# Patient Record
Sex: FEMALE | URBAN - METROPOLITAN AREA
[De-identification: names, ages, dates, MRNs, and addresses within clinical notes are randomized per-mention and may not be internally consistent; named-entity substitution may affect disease eponyms.]

---

## 2022-05-13 ENCOUNTER — APPOINTMENT (RX ONLY)
Dept: URBAN - METROPOLITAN AREA CLINIC 152 | Facility: CLINIC | Age: 52
Setting detail: DERMATOLOGY
End: 2022-05-13

## 2022-05-13 DIAGNOSIS — L20.89 OTHER ATOPIC DERMATITIS: ICD-10-CM | Status: INADEQUATELY CONTROLLED

## 2022-05-13 DIAGNOSIS — L82.1 OTHER SEBORRHEIC KERATOSIS: ICD-10-CM

## 2022-05-13 DIAGNOSIS — L85.3 XEROSIS CUTIS: ICD-10-CM

## 2022-05-13 DIAGNOSIS — L72.8 OTHER FOLLICULAR CYSTS OF THE SKIN AND SUBCUTANEOUS TISSUE: ICD-10-CM

## 2022-05-13 DIAGNOSIS — L70.0 ACNE VULGARIS: ICD-10-CM

## 2022-05-13 DIAGNOSIS — L81.4 OTHER MELANIN HYPERPIGMENTATION: ICD-10-CM

## 2022-05-13 PROBLEM — L30.9 DERMATITIS, UNSPECIFIED: Status: ACTIVE | Noted: 2022-05-13

## 2022-05-13 PROBLEM — L20.84 INTRINSIC (ALLERGIC) ECZEMA: Status: ACTIVE | Noted: 2022-05-13

## 2022-05-13 PROCEDURE — ? PRESCRIPTION MEDICATION MANAGEMENT

## 2022-05-13 PROCEDURE — ? PRESCRIPTION

## 2022-05-13 PROCEDURE — 99204 OFFICE O/P NEW MOD 45 MIN: CPT

## 2022-05-13 PROCEDURE — ? DIAGNOSIS COMMENT

## 2022-05-13 PROCEDURE — ? PHOTO-DOCUMENTATION

## 2022-05-13 PROCEDURE — ? COUNSELING

## 2022-05-13 RX ORDER — TRETINOIN 0.8 MG/G
GEL TOPICAL
Qty: 50 | Refills: 3 | Status: ERX | COMMUNITY
Start: 2022-05-13

## 2022-05-13 RX ORDER — HYDROQUINONE 40 MG/G
CREAM TOPICAL
Qty: 28.35 | Refills: 2 | Status: ERX | COMMUNITY
Start: 2022-05-13

## 2022-05-13 RX ORDER — CLOBETASOL PROPIONATE 0.5 MG/G
OINTMENT TOPICAL
Qty: 45 | Refills: 2 | Status: ERX | COMMUNITY
Start: 2022-05-13

## 2022-05-13 RX ADMIN — CLOBETASOL PROPIONATE: 0.5 OINTMENT TOPICAL at 00:00

## 2022-05-13 RX ADMIN — TRETINOIN: 0.8 GEL TOPICAL at 00:00

## 2022-05-13 RX ADMIN — HYDROQUINONE: 40 CREAM TOPICAL at 00:00

## 2022-05-13 ASSESSMENT — LOCATION DETAILED DESCRIPTION DERM
LOCATION DETAILED: RIGHT ANTERIOR LATERAL DISTAL THIGH
LOCATION DETAILED: RIGHT CENTRAL MALAR CHEEK
LOCATION DETAILED: RIGHT RIB CAGE
LOCATION DETAILED: LEFT PROXIMAL PRETIBIAL REGION
LOCATION DETAILED: RIGHT RADIAL VENTRAL WRIST
LOCATION DETAILED: LEFT INFERIOR MEDIAL MALAR CHEEK
LOCATION DETAILED: LEFT DISTAL PRETIBIAL REGION
LOCATION DETAILED: RIGHT DISTAL PRETIBIAL REGION
LOCATION DETAILED: MONS PUBIS

## 2022-05-13 ASSESSMENT — LOCATION ZONE DERM
LOCATION ZONE: ARM
LOCATION ZONE: FACE
LOCATION ZONE: VULVA
LOCATION ZONE: TRUNK
LOCATION ZONE: LEG

## 2022-05-13 ASSESSMENT — LOCATION SIMPLE DESCRIPTION DERM
LOCATION SIMPLE: RIGHT ARM
LOCATION SIMPLE: GROIN
LOCATION SIMPLE: RIGHT CHEEK
LOCATION SIMPLE: LEFT PRETIBIAL REGION
LOCATION SIMPLE: RIGHT THIGH
LOCATION SIMPLE: ABDOMEN
LOCATION SIMPLE: RIGHT PRETIBIAL REGION
LOCATION SIMPLE: LEFT CHEEK

## 2022-05-13 NOTE — HPI: ITCHING
How Severe Is Your Itching?: mild
Additional History: Pt notes itchy patch on R wrist started becoming itchy around December after she got the Covid-19 vaccine. R wrist appears red/dark brown. Itchy patch on pubic skin started 3-4 years ago. Pt notes no hair growth on that specific area and reports it has been getting darker.

## 2022-05-13 NOTE — PROCEDURE: PRESCRIPTION MEDICATION MANAGEMENT
Detail Level: Zone
Render In Strict Bullet Format?: No
Initiate Treatment: Clobetasol ointment PRN when itchy
Plan: Amlactin lotion

## 2022-05-13 NOTE — HPI: DISCOLORATION (HYPERPIGMENTATION)
Is This A New Presentation, Or A Follow-Up?: Discoloration
How Severe Is It?: mild
Additional History: Pt notes she was taking aspirin when she bumped into something and her R thigh developed a dark rayne. Discoloration on L leg appeared around the same time after she put on a bandaid for a small wound. Bandaids left a hyperpigmented rayne. She notes she used to not get any discoloration prior. \\n

## 2022-05-13 NOTE — PROCEDURE: DIAGNOSIS COMMENT
Comment: Acne: Disc’d Winlevi BID but pt notes improvement with Retin-A. Retin-A micro 0.08% gel called in today. Hyperpigmentation on L pretibial skin and R thigh: Disc’d plan to Tx with hydroquinone 4% cream for 6wks at a time. Eczema vs Lichen planus on pubic skin Recc’d Tx with clobetasol ointment when itchy only. Eczema on R wrist disc’d pt can apply Clobetasol ointment on there as well. Cyst on R flank reassured benign and no Tx Recc’d today. Xerosis: Recc’d Amlactin lotion. DPN: test spot on R cheek tx’ed with ED today. Discussed cosmetic price of $200 for the whole face, advised pt to come 30mins for numbing.
Detail Level: Simple
Render Risk Assessment In Note?: no

## 2022-05-13 NOTE — HPI: PIMPLES (ACNE)
Is This A New Presentation, Or A Follow-Up?: Acne
Additional Comments (Use Complete Sentences): Pt notes she’s had acne her whole life. Breakouts are mainly on jawlines. Pt reports she’s not currently experiencing breakouts. Hx of accutane 2x. Pt notes acne has cyclical and hormonal nature. Not on BC.

## 2022-05-13 NOTE — PROCEDURE: COUNSELING
Benzoyl Peroxide Pregnancy And Lactation Text: This medication is Pregnancy Category C. It is unknown if benzoyl peroxide is excreted in breast milk.
Minocycline Counseling: Patient advised regarding possible photosensitivity and discoloration of the teeth, skin, lips, tongue and gums.  Patient instructed to avoid sunlight, if possible.  When exposed to sunlight, patients should wear protective clothing, sunglasses, and sunscreen.  The patient was instructed to call the office immediately if the following severe adverse effects occur:  hearing changes, easy bruising/bleeding, severe headache, or vision changes.  The patient verbalized understanding of the proper use and possible adverse effects of minocycline.  All of the patient's questions and concerns were addressed.
Topical Clindamycin Counseling: Patient counseled that this medication may cause skin irritation or allergic reactions.  In the event of skin irritation, the patient was advised to reduce the amount of the drug applied or use it less frequently.   The patient verbalized understanding of the proper use and possible adverse effects of clindamycin.  All of the patient's questions and concerns were addressed.
Spironolactone Pregnancy And Lactation Text: This medication can cause feminization of the male fetus and should be avoided during pregnancy. The active metabolite is also found in breast milk.
Dapsone Pregnancy And Lactation Text: This medication is Pregnancy Category C and is not considered safe during pregnancy or breast feeding.
Isotretinoin Counseling: Patient should get monthly blood tests, not donate blood, not drive at night if vision affected, not share medication, and not undergo elective surgery for 6 months after tx completed. Side effects reviewed, pt to contact office should one occur.
Bactrim Pregnancy And Lactation Text: This medication is Pregnancy Category D and is known to cause fetal risk.  It is also excreted in breast milk.
Use Enhanced Medication Counseling?: No
Azelaic Acid Pregnancy And Lactation Text: This medication is considered safe during pregnancy and breast feeding.
Topical Retinoid counseling:  Patient advised to apply a pea-sized amount only at bedtime and wait 30 minutes after washing their face before applying.  If too drying, patient may add a non-comedogenic moisturizer. The patient verbalized understanding of the proper use and possible adverse effects of retinoids.  All of the patient's questions and concerns were addressed.
Minocycline Pregnancy And Lactation Text: This medication is Pregnancy Category D and not consider safe during pregnancy. It is also excreted in breast milk.
Topical Clindamycin Pregnancy And Lactation Text: This medication is Pregnancy Category B and is considered safe during pregnancy. It is unknown if it is excreted in breast milk.
Tetracycline Counseling: Patient counseled regarding possible photosensitivity and increased risk for sunburn.  Patient instructed to avoid sunlight, if possible.  When exposed to sunlight, patients should wear protective clothing, sunglasses, and sunscreen.  The patient was instructed to call the office immediately if the following severe adverse effects occur:  hearing changes, easy bruising/bleeding, severe headache, or vision changes.  The patient verbalized understanding of the proper use and possible adverse effects of tetracycline.  All of the patient's questions and concerns were addressed. Patient understands to avoid pregnancy while on therapy due to potential birth defects.
Doxycycline Counseling:  Patient counseled regarding possible photosensitivity and increased risk for sunburn.  Patient instructed to avoid sunlight, if possible.  When exposed to sunlight, patients should wear protective clothing, sunglasses, and sunscreen.  The patient was instructed to call the office immediately if the following severe adverse effects occur:  hearing changes, easy bruising/bleeding, severe headache, or vision changes.  The patient verbalized understanding of the proper use and possible adverse effects of doxycycline.  All of the patient's questions and concerns were addressed.
Isotretinoin Pregnancy And Lactation Text: This medication is Pregnancy Category X and is considered extremely dangerous during pregnancy. It is unknown if it is excreted in breast milk.
Winlevi Counseling:  I discussed with the patient the risks of topical clascoterone including but not limited to erythema, scaling, itching, and stinging. Patient voiced their understanding.
Birth Control Pills Counseling: Birth Control Pill Counseling: I discussed with the patient the potential side effects of OCPs including but not limited to increased risk of stroke, heart attack, thrombophlebitis, deep venous thrombosis, hepatic adenomas, breast changes, GI upset, headaches, and depression.  The patient verbalized understanding of the proper use and possible adverse effects of OCPs. All of the patient's questions and concerns were addressed.
Topical Retinoid Pregnancy And Lactation Text: This medication is Pregnancy Category C. It is unknown if this medication is excreted in breast milk.
Sarecycline Counseling: Patient advised regarding possible photosensitivity and discoloration of the teeth, skin, lips, tongue and gums.  Patient instructed to avoid sunlight, if possible.  When exposed to sunlight, patients should wear protective clothing, sunglasses, and sunscreen.  The patient was instructed to call the office immediately if the following severe adverse effects occur:  hearing changes, easy bruising/bleeding, severe headache, or vision changes.  The patient verbalized understanding of the proper use and possible adverse effects of sarecycline.  All of the patient's questions and concerns were addressed.
Doxycycline Pregnancy And Lactation Text: This medication is Pregnancy Category D and not consider safe during pregnancy. It is also excreted in breast milk but is considered safe for shorter treatment courses.
High Dose Vitamin A Counseling: Side effects reviewed, pt to contact office should one occur.
Topical Sulfur Applications Counseling: Topical Sulfur Counseling: Patient counseled that this medication may cause skin irritation or allergic reactions.  In the event of skin irritation, the patient was advised to reduce the amount of the drug applied or use it less frequently.   The patient verbalized understanding of the proper use and possible adverse effects of topical sulfur application.  All of the patient's questions and concerns were addressed.
Winlevi Pregnancy And Lactation Text: This medication is considered safe during pregnancy and breastfeeding.
Birth Control Pills Pregnancy And Lactation Text: This medication should be avoided if pregnant and for the first 30 days post-partum.
Azithromycin Counseling:  I discussed with the patient the risks of azithromycin including but not limited to GI upset, allergic reaction, drug rash, diarrhea, and yeast infections.
Tazorac Counseling:  Patient advised that medication is irritating and drying.  Patient may need to apply sparingly and wash off after an hour before eventually leaving it on overnight.  The patient verbalized understanding of the proper use and possible adverse effects of tazorac.  All of the patient's questions and concerns were addressed.
Erythromycin Counseling:  I discussed with the patient the risks of erythromycin including but not limited to GI upset, allergic reaction, drug rash, diarrhea, increase in liver enzymes, and yeast infections.
Detail Level: Zone
Dapsone Counseling: I discussed with the patient the risks of dapsone including but not limited to hemolytic anemia, agranulocytosis, rashes, methemoglobinemia, kidney failure, peripheral neuropathy, headaches, GI upset, and liver toxicity.  Patients who start dapsone require monitoring including baseline LFTs and weekly CBCs for the first month, then every month thereafter.  The patient verbalized understanding of the proper use and possible adverse effects of dapsone.  All of the patient's questions and concerns were addressed.
Topical Sulfur Applications Pregnancy And Lactation Text: This medication is Pregnancy Category C and has an unknown safety profile during pregnancy. It is unknown if this topical medication is excreted in breast milk.
Benzoyl Peroxide Counseling: Patient counseled that medicine may cause skin irritation and bleach clothing.  In the event of skin irritation, the patient was advised to reduce the amount of the drug applied or use it less frequently.   The patient verbalized understanding of the proper use and possible adverse effects of benzoyl peroxide.  All of the patient's questions and concerns were addressed.
Azithromycin Pregnancy And Lactation Text: This medication is considered safe during pregnancy and is also secreted in breast milk.
High Dose Vitamin A Pregnancy And Lactation Text: High dose vitamin A therapy is contraindicated during pregnancy and breast feeding.
Tazorac Pregnancy And Lactation Text: This medication is not safe during pregnancy. It is unknown if this medication is excreted in breast milk.
Spironolactone Counseling: Patient advised regarding risks of diarrhea, abdominal pain, hyperkalemia, birth defects (for female patients), liver toxicity and renal toxicity. The patient may need blood work to monitor liver and kidney function and potassium levels while on therapy. The patient verbalized understanding of the proper use and possible adverse effects of spironolactone.  All of the patient's questions and concerns were addressed.
Erythromycin Pregnancy And Lactation Text: This medication is Pregnancy Category B and is considered safe during pregnancy. It is also excreted in breast milk.
Bactrim Counseling:  I discussed with the patient the risks of sulfa antibiotics including but not limited to GI upset, allergic reaction, drug rash, diarrhea, dizziness, photosensitivity, and yeast infections.  Rarely, more serious reactions can occur including but not limited to aplastic anemia, agranulocytosis, methemoglobinemia, blood dyscrasias, liver or kidney failure, lung infiltrates or desquamative/blistering drug rashes.
Azelaic Acid Counseling: Patient counseled that medicine may cause skin irritation and to avoid applying near the eyes.  In the event of skin irritation, the patient was advised to reduce the amount of the drug applied or use it less frequently.   The patient verbalized understanding of the proper use and possible adverse effects of azelaic acid.  All of the patient's questions and concerns were addressed.
Detail Level: Simple
Detail Level: Detailed
Patient Specific Counseling (Will Not Stick From Patient To Patient): - discussed plan to Tx with hydroquinone 4% cream
Patient Specific Counseling (Will Not Stick From Patient To Patient): - test spot on R cheek tx’ed with ED today\\n- Discussed cosmetic price of $200 for the whole face\\n- before photo taken today

## 2022-05-13 NOTE — HPI: SKIN LESIONS
How Severe Is Your Skin Lesion?: mild
Have Your Skin Lesions Been Treated?: not been treated
Is This A New Presentation, Or A Follow-Up?: Growths
Additional History: Pt notes some lesions have been present since birth while others are new.

## 2022-05-20 ENCOUNTER — RX ONLY (OUTPATIENT)
Age: 52
Setting detail: RX ONLY
End: 2022-05-20

## 2022-05-20 RX ORDER — TRETINOIN 0.8 MG/G
GEL TOPICAL
Qty: 50 | Refills: 3 | Status: ERX

## 2022-05-23 RX ORDER — CLOBETASOL PROPIONATE 0.5 MG/G
OINTMENT TOPICAL
Qty: 45 | Refills: 2 | Status: ERX

## 2022-09-23 ENCOUNTER — APPOINTMENT (RX ONLY)
Dept: URBAN - METROPOLITAN AREA CLINIC 152 | Facility: CLINIC | Age: 52
Setting detail: DERMATOLOGY
End: 2022-09-23

## 2022-09-23 DIAGNOSIS — Z41.9 ENCOUNTER FOR PROCEDURE FOR PURPOSES OTHER THAN REMEDYING HEALTH STATE, UNSPECIFIED: ICD-10-CM

## 2022-09-23 DIAGNOSIS — L82.1 OTHER SEBORRHEIC KERATOSIS: ICD-10-CM

## 2022-09-23 PROCEDURE — ? COSMETIC CONSULTATION: FILLERS

## 2022-09-23 PROCEDURE — ? BENIGN DESTRUCTION COSMETIC MULTI

## 2022-09-23 PROCEDURE — ? DIAGNOSIS COMMENT

## 2022-09-23 PROCEDURE — ? COSMETIC QUOTE

## 2022-09-23 ASSESSMENT — LOCATION ZONE DERM
LOCATION ZONE: FACE
LOCATION ZONE: LIP

## 2022-09-23 ASSESSMENT — LOCATION DETAILED DESCRIPTION DERM
LOCATION DETAILED: RIGHT INFERIOR MEDIAL MALAR CHEEK
LOCATION DETAILED: LEFT UPPER CUTANEOUS LIP
LOCATION DETAILED: LEFT INFERIOR MEDIAL FOREHEAD

## 2022-09-23 ASSESSMENT — LOCATION SIMPLE DESCRIPTION DERM
LOCATION SIMPLE: RIGHT CHEEK
LOCATION SIMPLE: LEFT FOREHEAD
LOCATION SIMPLE: LEFT LIP

## 2022-09-23 NOTE — PROCEDURE: COSMETIC QUOTE
Xeomin Price Per Unit: 15
Perlane Price Per Syringe: 500
Restylane Price Per Syringe: 500
Misc Procedure Description: fillers - Discussed price of $700 per syringe.
Detail Level: Simple
Notice: We have created a more complete Cosmetic Quote plan.  The procedure name is also Cosmetic Quote.  Please review the new plan and hide the Cosmetic Quote plan you do not want to use.
Discount Percentage: 0

## 2022-09-23 NOTE — PROCEDURE: BENIGN DESTRUCTION COSMETIC MULTI
Total Number Of Lesions Treated: 10
Price (Use Numbers Only, No Special Characters Or $): 200
Post-Care Instructions: I reviewed with the patient in detail post-care instructions. Patient is to wear sunprotection, and avoid picking at any of the treated lesions. Pt may apply Vaseline to crusted or scabbing areas.
Anesthesia Volume In Cc: 0.5
Consent: The patient's consent was obtained including but not limited to risks of crusting, scabbing, blistering, scarring, darker or lighter pigmentary change, recurrence, incomplete removal and infection.
Detail Level: Zone

## 2022-09-23 NOTE — PROCEDURE: DIAGNOSIS COMMENT
Detail Level: Simple
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 Performed a fillers consultation today and instructed patient to call the office for an appointment if desired; Instructed patient to avoid aspirin 1 week before. F/u PRN.
Render Risk Assessment In Note?: no

## 2023-02-24 ENCOUNTER — APPOINTMENT (RX ONLY)
Dept: URBAN - METROPOLITAN AREA CLINIC 152 | Facility: CLINIC | Age: 53
Setting detail: DERMATOLOGY
End: 2023-02-24

## 2023-02-24 DIAGNOSIS — Z41.9 ENCOUNTER FOR PROCEDURE FOR PURPOSES OTHER THAN REMEDYING HEALTH STATE, UNSPECIFIED: ICD-10-CM

## 2023-02-24 PROCEDURE — ? DIAGNOSIS COMMENT

## 2023-02-24 NOTE — HPI: OTHER
Condition:: F/U
Please Describe Your Condition:: -s/p electrodessication on papulosa pilaris on face, patients said the ones that were treated in sep 2022 are not fully gone and would like to get them treated again

## 2023-02-24 NOTE — PROCEDURE: DIAGNOSIS COMMENT
Detail Level: Simple
Render Risk Assessment In Note?: no
Comment: Second tx with electrodessication for DPN’s - no charge today

## 2024-04-05 ENCOUNTER — APPOINTMENT (RX ONLY)
Dept: URBAN - METROPOLITAN AREA CLINIC 152 | Facility: CLINIC | Age: 54
Setting detail: DERMATOLOGY
End: 2024-04-05

## 2024-04-05 DIAGNOSIS — L72.8 OTHER FOLLICULAR CYSTS OF THE SKIN AND SUBCUTANEOUS TISSUE: ICD-10-CM

## 2024-04-05 DIAGNOSIS — L81.4 OTHER MELANIN HYPERPIGMENTATION: ICD-10-CM

## 2024-04-05 DIAGNOSIS — L30.8 OTHER SPECIFIED DERMATITIS: ICD-10-CM

## 2024-04-05 PROCEDURE — ? PRESCRIPTION

## 2024-04-05 PROCEDURE — 99214 OFFICE O/P EST MOD 30 MIN: CPT

## 2024-04-05 PROCEDURE — ? DIAGNOSIS COMMENT

## 2024-04-05 PROCEDURE — ? COUNSELING

## 2024-04-05 RX ORDER — TRIAMCINOLONE ACETONIDE 1 MG/G
OINTMENT TOPICAL
Qty: 30 | Refills: 1 | Status: ERX | COMMUNITY
Start: 2024-04-05

## 2024-04-05 RX ORDER — HYDROQUINONE 40 MG/G
CREAM TOPICAL
Qty: 28.35 | Refills: 2 | Status: ERX

## 2024-04-05 RX ADMIN — TRIAMCINOLONE ACETONIDE: 1 OINTMENT TOPICAL at 00:00

## 2024-04-05 ASSESSMENT — LOCATION ZONE DERM
LOCATION ZONE: HAND
LOCATION ZONE: LEG
LOCATION ZONE: TRUNK

## 2024-04-05 ASSESSMENT — LOCATION DETAILED DESCRIPTION DERM
LOCATION DETAILED: LEFT ULNAR DORSAL HAND
LOCATION DETAILED: RIGHT LATERAL SUPERIOR CHEST
LOCATION DETAILED: LEFT PROXIMAL PRETIBIAL REGION
LOCATION DETAILED: RIGHT ANTERIOR LATERAL DISTAL THIGH

## 2024-04-05 ASSESSMENT — LOCATION SIMPLE DESCRIPTION DERM
LOCATION SIMPLE: LEFT PRETIBIAL REGION
LOCATION SIMPLE: LEFT HAND
LOCATION SIMPLE: RIGHT THIGH
LOCATION SIMPLE: CHEST

## 2024-04-05 NOTE — PROCEDURE: DIAGNOSIS COMMENT
Detail Level: Detailed
Render Risk Assessment In Note?: no
Comment: Two small cysts on breast, pt interested in removal, would like removed in next few weeks in [place], referred to Dr. Gonsales
Comment: Per pt by hpi- d/s applying hydroquinone to affected areas to help, SEs reviewed in detail.
Comment: Per pt by hpi- chronic, not present during exam today, but photo reviewed. Recommend gentle skin care, discussed topical steroid as below for flares, SEs reviewed.

## 2024-04-05 NOTE — PROCEDURE: COUNSELING
Detail Level: Detailed
Patient Specific Counseling (Will Not Stick From Patient To Patient): - discussed plan to Tx with hydroquinone 4% cream
Detail Level: Zone

## 2024-04-05 NOTE — HPI: OTHER
Condition:: Cyst
Please Describe Your Condition:: is an established patient who is being seen for a chief complaint of Cyst, hyperpigmentation, and hand dermatitis. Pt present for possible cyst on the breast area been a few years since it’s been around, pt states she has not done any treatment. Has history of hyperpigmentation of legs, present since December. Hand dermatitis, previously flaring, not currently, would like treatments

## 2024-04-30 ENCOUNTER — APPOINTMENT (RX ONLY)
Dept: URBAN - METROPOLITAN AREA CLINIC 152 | Facility: CLINIC | Age: 54
Setting detail: DERMATOLOGY
End: 2024-04-30

## 2024-04-30 DIAGNOSIS — L72.8 OTHER FOLLICULAR CYSTS OF THE SKIN AND SUBCUTANEOUS TISSUE: ICD-10-CM

## 2024-04-30 PROCEDURE — ? PUNCH EXCISION

## 2024-04-30 PROCEDURE — ? COUNSELING

## 2024-04-30 PROCEDURE — ? DIAGNOSIS COMMENT

## 2024-04-30 PROCEDURE — 11400 EXC TR-EXT B9+MARG 0.5 CM<: CPT | Mod: 76

## 2024-04-30 PROCEDURE — 11400 EXC TR-EXT B9+MARG 0.5 CM<: CPT

## 2024-04-30 ASSESSMENT — LOCATION SIMPLE DESCRIPTION DERM
LOCATION SIMPLE: ABDOMEN
LOCATION SIMPLE: RIGHT BREAST

## 2024-04-30 ASSESSMENT — LOCATION ZONE DERM: LOCATION ZONE: TRUNK

## 2024-04-30 ASSESSMENT — LOCATION DETAILED DESCRIPTION DERM
LOCATION DETAILED: RIGHT LATERAL BREAST 7-8:00 REGION
LOCATION DETAILED: SUBXIPHOID

## 2024-04-30 NOTE — PROCEDURE: DIAGNOSIS COMMENT
Render Risk Assessment In Note?: no
Detail Level: Simple
Comment: She is going to see how these heal and then assess if she wants the one on the top of the R breast treated

## 2024-04-30 NOTE — PROCEDURE: PUNCH EXCISION
Size Of Lesion In Cm: 0.3
X Size Of Lesion In Cm (Optional): 0
Anesthesia Volume In Cc: 3
Excision Method: 4 mm Punch
Repair Type: None (Simple)
Complex Requirements: Extensive Undermining Performed?: No
Undermining Type: Entire Wound
Debridement Text: The wound edges were debrided prior to proceeding with the closure to facilitate wound healing.
Helical Rim Text: The closure involved the helical rim.
Vermilion Border Text: The closure involved the vermilion border.
Nostril Rim Text: The closure involved the nostril rim.
Retention Suture Text: Retention sutures were placed to support the closure and prevent dehiscence.
Suture Removal: 7 days
Lab: -[id number]
Detail Level: Detailed
Excision Depth: adipose tissue
Medical Necessity Clause: The excision was medically necessary because the lesion which was excised was
Anesthesia Type: 1% lidocaine with epinephrine
Hemostasis: Electrodesiccation
Estimated Blood Loss (Cc): minimal
Deep Sutures: 4-0 Vicryl
Epidermal Sutures: 4-0 Prolene
Epidermal Closure: simple interrupted
Wound Care: Petrolatum
Dressing: dry sterile dressing
Complex Repair Preamble Text (Leave Blank If You Do Not Want): Extensive wide undermining was performed.
Intermediate Repair Preamble Text (Leave Blank If You Do Not Want): Undermining was performed with blunt dissection.
1.5 Mm Punch Excision Text: A 1.5 mm punch was used to make an initial incision over the lesion.  After this overlying column of skin was removed, blunt dissection was used to free the lesion from the surrounding tissues and the lesion was extirpated through the surgical opening made by the punch biopsy.
2 Mm Punch Excision Text: A 2 mm punch was used to make an initial incision over the lesion.  After this overlying column of skin was removed, blunt dissection was used to free the lesion from the surrounding tissues and the lesion was extirpated through the surgical opening made by the punch biopsy.
2.5 Mm Punch Excision Text: A 2.5 mm punch was used to make an initial incision over the lesion.  After this overlying column of skin was removed, blunt dissection was used to free the lesion from the surrounding tissues and the lesion was extirpated through the surgical opening made by the punch biopsy.
3 Mm Punch Excision Text: A 3 mm punch was used to make an initial incision over the lesion.  After this overlying column of skin was removed, blunt dissection was used to free the lesion from the surrounding tissues and the lesion was extirpated through the surgical opening made by the punch biopsy.
3.5 Mm Punch Excision Text: A 3.5 mm punch was used to make an initial incision over the lesion.  After this overlying column of skin was removed, blunt dissection was used to free the lesion from the surrounding tissues and the lesion was extirpated through the surgical opening made by the punch biopsy.
4 Mm Punch Excision Text: A 4 mm punch was used to make an initial incision over the lesion.  After this overlying column of skin was removed, blunt dissection was used to free the lesion from the surrounding tissues and the lesion was extirpated through the surgical opening made by the punch biopsy.
4.5 Mm Punch Excision Text: A 4.5 mm punch was used to make an initial incision over the lesion.  After this overlying column of skin was removed, blunt dissection was used to free the lesion from the surrounding tissues and the lesion was extirpated through the surgical opening made by the punch biopsy.
5 Mm Punch Excision Text: A 5 mm punch was used to make an initial incision over the lesion.  After this overlying column of skin was removed, blunt dissection was used to free the lesion from the surrounding tissues and the lesion was extirpated through the surgical opening made by the punch biopsy.
6 Mm Punch Excision Text: A 6 mm punch was used to make an initial incision over the lesion.  After this overlying column of skin was removed, blunt dissection was used to free the lesion from the surrounding tissues and the lesion was extirpated through the surgical opening made by the punch biopsy.
7 Mm Punch Excision Text: A 7 mm punch was used to make an initial incision over the lesion.  After this overlying column of skin was removed, blunt dissection was used to free the lesion from the surrounding tissues and the lesion was extirpated through the surgical opening made by the punch biopsy.
8 Mm Punch Excision Text: A 8 mm punch was used to make an initial incision over the lesion.  After this overlying column of skin was removed, blunt dissection was used to free the lesion from the surrounding tissues and the lesion was extirpated through the surgical opening made by the punch biopsy.
10 Mm Punch Excision Text: A 10 mm punch was used to make an initial incision over the lesion.  After this overlying column of skin was removed, blunt dissection was used to free the lesion from the surrounding tissues and the lesion was extirpated through the surgical opening made by the punch biopsy.
12 Mm Punch Excision Text: A 12 mm punch was used to make an initial incision over the lesion.  After this overlying column of skin was removed, blunt dissection was used to free the lesion from the surrounding tissues and the lesion was extirpated through the surgical opening made by the punch biopsy.
Purse String (Intermediate) Text: Given the location of the defect and the characteristics of the surrounding skin a purse string intermediate closure was deemed most appropriate.  Undermining was performed circumferentially around the surgical defect.  A purse string suture was then placed and tightened.
Medical Necessity Clause: This procedure was medically necessary because the lesion that was treated was:
Consent was obtained from the patient. The risks and benefits to therapy were discussed in detail. Specifically, the risks of infection, scarring, bleeding, prolonged wound healing, incomplete removal, allergy to anesthesia, nerve injury and recurrence were addressed. Prior to the procedure, the treatment site was clearly identified and confirmed by the patient. All components of Universal Protocol/PAUSE Rule completed.
Render Post-Care Instructions In Note?: yes
Post-Care Instructions: I reviewed with the patient in detail post-care instructions. Patient is not to engage in any heavy lifting, exercise, or swimming for the next 14 days. Should the patient develop any fevers, chills, bleeding, severe pain patient will contact the office immediately.
Billing Type: Third-Party Bill
Size Of Lesion In Cm: 0.2
Suture Removal: 14 days
Epidermal Sutures: 5-0 Prolene

## 2024-04-30 NOTE — HPI: PROCEDURE (SKIN SURGERY)
[facility] Medicine Services  INPATIENT HISTORY AND PHYSICAL       Patient Care Team:  Mami Perez APRN as PCP - General (Family Medicine)  Gonsalo Hogue MD as Consulting Physician (Hospitalist)    Chief complaint   Chief Complaint   Patient presents with   • Chest Pain       Subjective     Patient is a 40 y.o. male with past medical history of CAD s/p stent, type 2 DM, ADOLFO, atrial fibrillation, chronic pulmonary emboli on anticoagulation with Xarelto, COPD, and morbid obesity with BMI of 62 who presented today with complaints of chest pain and shortness of breath that started around 10 PM on 3/30/2021.    Patient actually had a preceding episode of retrosternal chest discomfort around 12 noon on 3/30/2021 which gradually subsided after about 90 minutes.  However around 10 PM on 3/30/2021 he developed another episode of retrosternal chest pain associated with shortness of breath and a feeling of being smothered.  Chest pain was 8/10 intensity and radiated to the left shoulder and was pressure-like in character.  It was worsened by movement and he felt some palpitations with some lightheadedness.  He denied diaphoresis.  Chest pain did not improve after he took nitroglycerin tablets at home x2.  He also had nausea and 3 episodes of vomiting.  In the emergency room at [facility], his blood pressure was elevated at 221/91 mmHg.  EKG showed normal sinus rhythm.  Initial troponin was negative.  proBNP was within normal limits.  He was recommended for observation.    Of note patient has had multiple ER visits on account of chest pain over the last few months.  He had a cardiac catheterization on 9/2/2020 with cardiologist Dr. Tejada that showed mild in-stent restenosis in the mid LAD with no significant coronary artery disease.  Patient is on Ranexa.    Review of Systems   Constitutional: Negative for activity change, appetite change, chills, fatigue 
and fever.   HENT: Negative for congestion, rhinorrhea, sore throat and trouble swallowing.    Respiratory: Positive for shortness of breath. Negative for cough, chest tightness and wheezing.    Cardiovascular: Positive for chest pain. Negative for palpitations and leg swelling.   Gastrointestinal: Positive for nausea and vomiting. Negative for abdominal distention, abdominal pain and diarrhea.   Genitourinary: Negative for difficulty urinating, dysuria and hematuria.   Musculoskeletal: Negative for arthralgias, back pain and myalgias.   Skin: Negative for pallor and rash.   Neurological: Negative for dizziness, syncope, weakness, light-headedness and headaches.   Hematological: Negative for adenopathy. Does not bruise/bleed easily.   Psychiatric/Behavioral: Negative for agitation and confusion. The patient is not nervous/anxious.      History  Past Medical History:   Diagnosis Date   • CAD (coronary artery disease)    • Chronic back pain    • Chronic pulmonary embolism (CMS/HCC)    • Coronary artery disease    • Diabetes mellitus (CMS/HCC)    • Factor II deficiency (CMS/HCC)    • Fatty liver    • GERD (gastroesophageal reflux disease)    • Hyperlipidemia    • Hypertension    • Morbid obesity (CMS/HCC)    • RLS (restless legs syndrome)    • Seizures (CMS/HCC)    • Sleep apnea      Past Surgical History:   Procedure Laterality Date   • CARDIAC CATHETERIZATION N/A 3/15/2017   • CARDIAC CATHETERIZATION N/A 3/15/2017   • CARDIAC CATHETERIZATION N/A 3/1/2018   • CARDIAC CATHETERIZATION N/A 9/2/2020   • CHOLECYSTECTOMY     • CORONARY ANGIOPLASTY WITH STENT PLACEMENT     • MA RT/LT HEART CATHETERS N/A 3/15/2017   • TRANSESOPHAGEAL ECHOCARDIOGRAM (MONICA)       Family History   Problem Relation Age of Onset   • Heart disease Mother    • Obesity Mother    • Sleep apnea Father    • Cancer Paternal Grandfather      Social History     Tobacco Use   • Smoking status: Former Smoker     Packs/day: 0.25     Years: 0.50     Pack years: 
0.12     Types: Cigarettes     Quit date:      Years since quittin.2   • Smokeless tobacco: Current User     Types: Snuff   Vaping Use   • Vaping Use: Never used   Substance Use Topics   • Alcohol use: No   • Drug use: No     Medications Prior to Admission   Medication Sig Dispense Refill Last Dose   • albuterol (PROVENTIL HFA;VENTOLIN HFA) 108 (90 BASE) MCG/ACT inhaler Inhale 2 puffs Every 4 (Four) Hours As Needed for Wheezing.      • atorvastatin (LIPITOR) 80 MG tablet Take 1 tablet by mouth Every Night. 90 tablet 3    • dilTIAZem CD (Cardizem CD) 120 MG 24 hr capsule Take 1 capsule by mouth Daily. 90 capsule 0    • fenofibrate micronized (LOFIBRA) 134 MG capsule Take 1 capsule by mouth Every Morning Before Breakfast. 90 capsule 3    • gabapentin (NEURONTIN) 300 MG capsule Take 2 capsules by mouth 2 (Two) Times a Day. 120 capsule 0    • glucose blood test strip Use as instructed 100 each 12    • glucose monitor monitoring kit 1 each As Needed (check blood glucose 3x daily). 1 each 3    • insulin glargine (LANTUS) 100 UNIT/ML injection Inject 60 Units under the skin into the appropriate area as directed Every Night. 50 mL 3    • insulin lispro (HumaLOG) 100 UNIT/ML injection Inject 15 Units under the skin into the appropriate area as directed 3 (Three) Times a Day Before Meals. (Patient taking differently: Inject 25 Units under the skin into the appropriate area as directed 3 (Three) Times a Day Before Meals.) 30 mL 3    • isosorbide mononitrate (IMDUR) 120 MG 24 hr tablet Take 1 tablet by mouth Every Morning for 30 days. 30 tablet 0    • lisinopril (PRINIVIL,ZESTRIL) 40 MG tablet Take 1 tablet by mouth Every Night. 90 tablet 3    • methocarbamol (Robaxin) 500 MG tablet Take 1 tablet by mouth 4 (Four) Times a Day. 56 tablet 3    • metoprolol succinate XL (TOPROL-XL) 50 MG 24 hr tablet Take 1 tablet by mouth Daily. 90 tablet 3    • Microlet Lancets misc One touch delica lancets 100 each 5    • nitroglycerin 
"(NITROSTAT) 0.4 MG SL tablet Place 1 tablet under the tongue Every 5 (Five) Minutes As Needed for Chest Pain for up to 15 days. 25 tablet 6    • pantoprazole (PROTONIX) 40 MG EC tablet Take 1 tablet by mouth Every Night. 90 tablet 3    • pramipexole (MIRAPEX) 1 MG tablet Take 2 tablets by mouth Every Night. 180 tablet 3    • prasugrel (EFFIENT) 10 MG tablet Take 1 tablet by mouth Daily. 90 tablet 3    • ranolazine (RANEXA) 1000 MG 12 hr tablet Take 1 tablet by mouth Every 12 (Twelve) Hours. 60 tablet 5    • ReliOn Insulin Syringe 31G X 15/64\" 0.5 ML misc For use with insulin 100 each 5    • rivaroxaban (XARELTO) 20 MG tablet Take 1 tablet by mouth Daily With Dinner. 90 tablet 3    • SITagliptin (Januvia) 100 MG tablet Take 1 tablet by mouth Daily. 30 tablet 5    • traZODone (DESYREL) 300 MG tablet Take 1 tablet by mouth every night at bedtime. 90 tablet 3      Allergies:  Glipizide, Reglan [metoclopramide], Tramadol, and Risperidone  Prior to Admission medications    Medication Sig Start Date End Date Taking? Authorizing Provider   albuterol (PROVENTIL HFA;VENTOLIN HFA) 108 (90 BASE) MCG/ACT inhaler Inhale 2 puffs Every 4 (Four) Hours As Needed for Wheezing.    Provider, MD Latrice   atorvastatin (LIPITOR) 80 MG tablet Take 1 tablet by mouth Every Night. 9/29/20   Mami Perez APRN   dilTIAZem CD (Cardizem CD) 120 MG 24 hr capsule Take 1 capsule by mouth Daily. 10/8/20   Judy Crews MD   fenofibrate micronized (LOFIBRA) 134 MG capsule Take 1 capsule by mouth Every Morning Before Breakfast. 9/29/20   Mami Perez APRN   gabapentin (NEURONTIN) 300 MG capsule Take 2 capsules by mouth 2 (Two) Times a Day. 3/9/21   Mami Perez APRN   glucose blood test strip Use as instructed 1/8/21   Mami Perez APRN   glucose monitor monitoring kit 1 each As Needed (check blood glucose 3x daily). 9/29/20   Mami Perez APRN   insulin glargine (LANTUS) 100 UNIT/ML injection "
"Inject 60 Units under the skin into the appropriate area as directed Every Night. 9/29/20   Mami Perez APRN   insulin lispro (HumaLOG) 100 UNIT/ML injection Inject 15 Units under the skin into the appropriate area as directed 3 (Three) Times a Day Before Meals.  Patient taking differently: Inject 25 Units under the skin into the appropriate area as directed 3 (Three) Times a Day Before Meals. 9/29/20   Mami Perez APRN   isosorbide mononitrate (IMDUR) 120 MG 24 hr tablet Take 1 tablet by mouth Every Morning for 30 days. 10/10/17 9/29/21  Minesh Gregg MD   lisinopril (PRINIVIL,ZESTRIL) 40 MG tablet Take 1 tablet by mouth Every Night. 9/29/20   Mami Perez APRN   methocarbamol (Robaxin) 500 MG tablet Take 1 tablet by mouth 4 (Four) Times a Day. 2/2/21   Mami Perez APRN   metoprolol succinate XL (TOPROL-XL) 50 MG 24 hr tablet Take 1 tablet by mouth Daily. 9/29/20   Mami Perez APRN   Microlet Lancets misc One touch delica lancets 1/8/21   Mami Perez APRN   nitroglycerin (NITROSTAT) 0.4 MG SL tablet Place 1 tablet under the tongue Every 5 (Five) Minutes As Needed for Chest Pain for up to 15 days. 10/10/17 9/29/21  Minesh Gregg MD   pantoprazole (PROTONIX) 40 MG EC tablet Take 1 tablet by mouth Every Night. 9/29/20   Mami Perez APRN   pramipexole (MIRAPEX) 1 MG tablet Take 2 tablets by mouth Every Night. 9/29/20   Mami Perez APRN   prasugrel (EFFIENT) 10 MG tablet Take 1 tablet by mouth Daily. 9/29/20   Mami Perez APRN   ranolazine (RANEXA) 1000 MG 12 hr tablet Take 1 tablet by mouth Every 12 (Twelve) Hours. 2/2/21   Perez, Mami Quin, APRN   ReliOn Insulin Syringe 31G X 15/64\" 0.5 ML misc For use with insulin 9/29/20   Mami Perez APRN   rivaroxaban (XARELTO) 20 MG tablet Take 1 tablet by mouth Daily With Dinner. 9/29/20   Mami Perez APRN   SITagliptin (Januvia) 100 MG "
tablet Take 1 tablet by mouth Daily. 1/8/21   Mami Perez APRN   traZODone (DESYREL) 300 MG tablet Take 1 tablet by mouth every night at bedtime. 9/29/20   Mami Perez APRN       I have reviewed the patient's current medications    Objective        Vital Signs  Temp:  [97.1 °F (36.2 °C)-97.3 °F (36.3 °C)] 97.1 °F (36.2 °C)  Heart Rate:  [] 73  Resp:  [24] 24  BP: (140-221)/() 180/84      Physical Exam  Constitutional:       General: He is not in acute distress.     Appearance: He is well-developed. He is obese. He is not diaphoretic.      Comments: Morbidly obese.   HENT:      Head: Normocephalic and atraumatic.   Eyes:      General: No scleral icterus.     Conjunctiva/sclera: Conjunctivae normal.      Pupils: Pupils are equal, round, and reactive to light.   Neck:      Thyroid: No thyromegaly.      Vascular: No JVD.      Trachea: No tracheal deviation.   Cardiovascular:      Rate and Rhythm: Normal rate and regular rhythm.      Heart sounds: Normal heart sounds. No murmur heard.   No friction rub. No gallop.    Pulmonary:      Effort: Pulmonary effort is normal. No respiratory distress.      Breath sounds: No stridor. No wheezing or rales.      Comments: Reduced breath sounds globally.  Chest:      Chest wall: No tenderness.   Abdominal:      General: Bowel sounds are normal. There is no distension.      Palpations: Abdomen is soft. There is no mass.      Tenderness: There is no abdominal tenderness. There is no guarding or rebound.      Hernia: No hernia is present.   Musculoskeletal:         General: No tenderness or deformity. Normal range of motion.      Cervical back: Normal range of motion and neck supple.   Lymphadenopathy:      Cervical: No cervical adenopathy.   Skin:     General: Skin is warm and dry.      Coloration: Skin is not pale.      Findings: No erythema or rash.   Neurological:      Mental Status: He is alert and oriented to person, place, and time.      Cranial 
Nerves: No cranial nerve deficit.      Sensory: No sensory deficit.      Motor: No abnormal muscle tone.      Coordination: Coordination normal.   Psychiatric:         Behavior: Behavior normal.         Thought Content: Thought content normal.         Judgment: Judgment normal.       Results Review:     Results from last 7 days   Lab Units 04/01/21  0000   SODIUM mmol/L 138   POTASSIUM mmol/L 3.6   CHLORIDE mmol/L 101   CO2 mmol/L 25.0   BUN mg/dL 7   CREATININE mg/dL 0.67*   GLUCOSE mg/dL 386*   CALCIUM mg/dL 9.0   BILIRUBIN mg/dL 0.2   ALK PHOS U/L 80   ALT (SGPT) U/L 27   AST (SGOT) U/L 26       Results from last 7 days   Lab Units 04/01/21  0000   MAGNESIUM mg/dL 1.8       Results from last 7 days   Lab Units 04/01/21  0000   WBC 10*3/mm3 7.43   HEMOGLOBIN g/dL 13.9   HEMATOCRIT % 41.0   PLATELETS 10*3/mm3 187           Imaging Results (Last 7 Days)     Procedure Component Value Units Date/Time    CT Head Without Contrast [[id number]] Collected: 04/01/21 0008     Updated: 04/01/21 0027    Narrative:      CLINICAL HISTORY: Headache    COMPARISON: January 19, 2021    TECHNIQUE: This exam was performed according to our departmental  dose-optimization program, which includes automated exposure  control, adjustment of the mA and/or KV according to the  patient's size and/or use of iterative reconstruction technique.  MIP reconstructed sagittal and coronal images were also obtained.    FINDINGS: There is no intracranial mass, hemorrhage, edema,  evidence of infarction, midline shift, or extra-axial fluid  collection demonstrated. Bony structures of the face and skull  appear intact. Paranasal sinuses are clear. Middle ear spaces and  mastoid air cells are clear.      Impression:      No acute change since January 19, 2021.          Thank you for allowing us to participate in the care of your  patient.    Electronically signed by:  Julito Tyson DO  4/1/2021 12:26  AM CDT Workstation: [id number]    XR Chest 1 
View [[id number]] Collected: 04/01/21 0000     Updated: 04/01/21 0021    Narrative:      PORTABLE CHEST X-RAY    CLINICAL HISTORY: chest pain, dyspnea    COMPARISON:  1/19/2021.    FINDINGS:  Single portable view of the chest obtained.  There are  various overlying monitor leads/artifacts in place. The lungs are  well expanded and clear where they can be visualized.  Cardiac  size is within normal limits.  Vascularity is normal considering  technique.  No pleural fluid is demonstrated by portable imaging.   Chronic left rib deformities are again noted.        Impression:        No active disease by portable imaging.    Electronically signed by:  Mikey Grace MD  4/1/2021 12:20 AM  CDT Workstation: [id number]          Assessment / Plan       Hospital Problem List:  Principal Problem:    Chest pain  Active Problems:    GERD (gastroesophageal reflux disease)    Morbid obesity (CMS/[facility])    Hypertension    Type 2 diabetes mellitus with hyperglycemia, with long-term current use of insulin (CMS/[facility])  CAD s/p stent to LAD  ADOLFO  Atrial fibrillation  Chronic pulmonary emboli on anticoagulation with Xarelto  COPD  Morbid obesity with BMI of 62    Plan  -Patient has chest pain with palpitations, shortness of breath as well as vomiting.  -Initial troponin negative and EKG shows no ST segment changes.  -We will trend troponins  -Evaluate cardiac function with echocardiogram  -Patient has had multiple admissions and evaluations for chest pain over the last few months. Will discuss with patient's cardiologist for additional recommendations  -Continue aspirin, Effient, Lipitor, Ranexa for coronary artery disease.  -Start Nitropaste every 6 hours and IV morphine 4 mg every 6 hours as needed for chest pain  -NovoLog sliding scale and Levemir for type 2 diabetes mellitus with hyperglycemia  -Continue Cardizem for atrial fibrillation and Xarelto for chronic anticoagulation due to PE  -Start DuoNebs every 6 hours for COPD due to 
patient's chest tightness  -Continue other home medications  -Nutritional counseling for morbid obesity  -DVT prophylaxis with Xarelto  -CODE STATUS is full code    I discussed the patient's findings and my recommendations with patient.    Judy Crews MD  04/01/21  03:09 CDT        Part of this note may be an electronic transcription/translation of spoken language to printed text using the Dragon Dictation System.             
Has The Growth Been Previously Biopsied?: has not been previously biopsied
Body Location Override (Optional): Right breast & sternum

## 2024-05-14 ENCOUNTER — APPOINTMENT (RX ONLY)
Dept: URBAN - METROPOLITAN AREA CLINIC 152 | Facility: CLINIC | Age: 54
Setting detail: DERMATOLOGY
End: 2024-05-14

## 2024-05-14 DIAGNOSIS — Z48.02 ENCOUNTER FOR REMOVAL OF SUTURES: ICD-10-CM

## 2024-05-14 PROCEDURE — ? SUTURE REMOVAL (GLOBAL PERIOD)

## 2024-05-14 PROCEDURE — 99024 POSTOP FOLLOW-UP VISIT: CPT

## 2024-05-14 PROCEDURE — ? COUNSELING

## 2024-05-14 ASSESSMENT — LOCATION DETAILED DESCRIPTION DERM
LOCATION DETAILED: RIGHT INFRAMAMMARY CREASE (OUTER QUADRANT)
LOCATION DETAILED: SUBXIPHOID

## 2024-05-14 ASSESSMENT — LOCATION SIMPLE DESCRIPTION DERM
LOCATION SIMPLE: RIGHT BREAST
LOCATION SIMPLE: ABDOMEN

## 2024-05-14 ASSESSMENT — LOCATION ZONE DERM: LOCATION ZONE: TRUNK

## 2024-05-14 NOTE — PROCEDURE: SUTURE REMOVAL (GLOBAL PERIOD)
Detail Level: Detailed
Add 85827 Cpt? (Important Note: In 2017 The Use Of 37207 Is Being Tracked By Cms To Determine Future Global Period Reimbursement For Global Periods): yes